# Patient Record
Sex: FEMALE | Employment: OTHER | URBAN - METROPOLITAN AREA
[De-identification: names, ages, dates, MRNs, and addresses within clinical notes are randomized per-mention and may not be internally consistent; named-entity substitution may affect disease eponyms.]

---

## 2024-09-17 PROCEDURE — 0753T DGTZ GLS MCRSCP SLD LEVEL IV: CPT

## 2024-09-17 PROCEDURE — 88305 TISSUE EXAM BY PATHOLOGIST: CPT | Performed by: SPECIALIST

## 2024-09-17 PROCEDURE — 88305 TISSUE EXAM BY PATHOLOGIST: CPT

## 2024-09-18 ENCOUNTER — LAB REQUISITION (OUTPATIENT)
Dept: LAB | Facility: HOSPITAL | Age: 69
End: 2024-09-18
Payer: COMMERCIAL

## 2024-09-18 DIAGNOSIS — R10.11 RIGHT UPPER QUADRANT PAIN: ICD-10-CM

## 2024-09-26 LAB
LABORATORY COMMENT REPORT: NORMAL
PATH REPORT.FINAL DX SPEC: NORMAL
PATH REPORT.GROSS SPEC: NORMAL
PATH REPORT.RELEVANT HX SPEC: NORMAL
PATH REPORT.TOTAL CANCER: NORMAL

## 2024-10-22 ENCOUNTER — DOCUMENTATION (OUTPATIENT)
Dept: GASTROENTEROLOGY | Facility: HOSPITAL | Age: 69
End: 2024-10-22
Payer: COMMERCIAL

## 2024-10-22 NOTE — PROGRESS NOTES
Dr. Armando Heart's office received a referral from Dr. Ronak Barfield for this patient to undergo an ERCP for entero-biliary fistula. Dr. Uri Sanchez reviewed the referral on 10/21/2024. Per Dr. Sanchez, patient to get scheduled for a clinic consultation with Dr. Heart.    Concetta Abbasi was notified to call and schedule this patient. Contact Belinda Jones RN at [phone] for any questions.      See below for supporting clinical information from the referral sent by Dr. Barfield's office and from medical records:    Office Visit  10/16/2024  Maricruz Martinez - 69 y.o. Female; born [birth year]Progress note - 10/16/2024, generated on Oct. 18, 2024October 18, 2024  Allergies     No Known Allergies  Reason For Referral     Reason For Referral  Reason referral to advanced endoscopist to consider ERCP for entero-biliary fistula, also referred to hepato-biliary surgeon  referral in pt docs and faxed to Fely De Guzman @ 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 Mares for entero-biliary fistula, also referred to advanced endoscopist to discuss ERCP  referral in pt docs and faxed to Fely De Guzman @ 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)  Referral Organization  [facility]  Referring Provider First Name Ronak  Referring Provider Last Name            Lico  [facility]            [address] [place] [address]           10/16/2024            Ronak Barfield Epigastric abdominal pain R10.13 ; Chronic nausea R11.0 ; Postprandial RUQ pain R10.11 ; History of  cholecystectomy Z90.49 ; Loose stools R19.5 ; Pneumobilia K83.8 ; Biliary tract fistula K83.3 and Common bile duct dilatation     10/16/2024     Biliary tract fistula (ICD-10 - 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.3)                       Imaging showing pneumobilia and entero-biliary fistula with dilated CBD, pt with RUQ pain with certain foods especially carbohydrates however overall much improved  LFTs/GGT, CBC ordered, encouraged patient to have these done  Referral to hepatobiliary surgeon  Referral to advanced endoscopist for to discuss possible ERCP and EUS     Plan Of Treatment     Treatment Notes  Plan Of Treatment  Assessment     Notes  Postprandial RUQ pain            Describes chronic early morning nausea with right upper quadrant abdominal pain which worsens with food especially carbohydrates. No dyspepsia features. Symptoms overall improved but persistent when she consumes carbohydrate rich foods. EGD, colonoscopy, CT abdomen pelvis, MRI of the abdomen reviewed today with results noted above. Signs of possible enterobiliary fistula  Breath test given to assess for CSID  Continue to avoid simple sugars/carbohydrates  Avoid NSAIDs  Patient prefers to avoid any medication  Rest as below  Loose stools    Describes chronic mud-like stools, change in bowel habits overall. She is on magnesium supplements which is likely contributing. Colonoscopy with biopsies unremarkable  Biliary tract fistula       Imaging showing pneumobilia and entero-biliary fistula with dilated CBD, pt with RUQ pain with certain foods especially carbohydrates however overall much improved  LFTs/GGT, CBC ordered, encouraged patient to have these done  Referral to hepatobiliary surgeon  Referral to advanced endoscopist for to discuss possible ERCP and EUS        Interval Hx 10/16/24:  9/17/24 EGD Showed showed normal duodenum, biopsies negative for celiac disease, mild gastritis, biopsies negative for H. pylori, mild esophagitis, biopsies negative for Connor's  esophagus. Otherwise normal. Colonoscopy showed small HP of the sigmoid colon, Normal-appearing colon otherwise biopsies negative for microscopic colitis, Diverticulosis of the left colon  CT abd/pel 9/5/24 showed left-sided pneumobilia, unchanged from prior exam dated the 2021, and may berelated to prior sphincterotomy versus enterobiliary fistula. Correlate clinically.2. Few scattered colonic diverticula. No acute diverticulitis.     10/14/24 MRI abdomen w/ and w/o showed dilatation of the common bile duct measuring at least 14 mm, this abuts the distal stomach/proximal duodenum which courses into the gallbladder fossa. On prior studies pneumobilia was present. Findings again raise the possibility of enterobiliary fistula. Intraluminal fluid signal involving the distal stomach/proximal duodenum limits evaluation on MRCP.2. Hepatic steatosis.3. Indeterminate 1.8 cm subcapsular enhancing wedge-shaped lesion liver dome,indeterminate possibly focal scarring/fibrosis. Recommend follow-up 3-6 months or as per clinical protocol.4. Status post cholecystectomy     Surgical History: Cholecystectomy 1972 Tonsillectomy Colonoscopy & EGD - Dr. Barfield 9/2024

## 2024-10-24 ENCOUNTER — TELEPHONE (OUTPATIENT)
Dept: GASTROENTEROLOGY | Facility: CLINIC | Age: 69
End: 2024-10-24
Payer: COMMERCIAL

## 2024-11-26 ENCOUNTER — OFFICE VISIT (OUTPATIENT)
Dept: GASTROENTEROLOGY | Facility: CLINIC | Age: 69
End: 2024-11-26
Payer: COMMERCIAL

## 2024-11-26 VITALS
SYSTOLIC BLOOD PRESSURE: 139 MMHG | HEART RATE: 66 BPM | DIASTOLIC BLOOD PRESSURE: 71 MMHG | TEMPERATURE: 97.3 F | BODY MASS INDEX: 28.85 KG/M2 | HEIGHT: 64 IN | WEIGHT: 169 LBS

## 2024-11-26 DIAGNOSIS — K59.9 MALDIGESTION SYNDROME: Primary | ICD-10-CM

## 2024-11-26 DIAGNOSIS — K83.8 PNEUMOBILIA: ICD-10-CM

## 2024-11-26 PROCEDURE — 1125F AMNT PAIN NOTED PAIN PRSNT: CPT | Performed by: INTERNAL MEDICINE

## 2024-11-26 PROCEDURE — 99203 OFFICE O/P NEW LOW 30 MIN: CPT | Performed by: INTERNAL MEDICINE

## 2024-11-26 PROCEDURE — 1159F MED LIST DOCD IN RCRD: CPT | Performed by: INTERNAL MEDICINE

## 2024-11-26 PROCEDURE — 1036F TOBACCO NON-USER: CPT | Performed by: INTERNAL MEDICINE

## 2024-11-26 PROCEDURE — 99213 OFFICE O/P EST LOW 20 MIN: CPT | Performed by: INTERNAL MEDICINE

## 2024-11-26 PROCEDURE — 3008F BODY MASS INDEX DOCD: CPT | Performed by: INTERNAL MEDICINE

## 2024-11-26 PROCEDURE — 1160F RVW MEDS BY RX/DR IN RCRD: CPT | Performed by: INTERNAL MEDICINE

## 2024-11-26 RX ORDER — CALCIUM CARBONATE 500(1250)
1 TABLET ORAL
COMMUNITY

## 2024-11-26 RX ORDER — KRILL/OM-3/DHA/EPA/PHOSPHO/AST 1000-170MG
CAPSULE ORAL
COMMUNITY

## 2024-11-26 RX ORDER — MULTIVITAMIN
1 TABLET ORAL DAILY
COMMUNITY

## 2024-11-26 RX ORDER — LICORICE ROOT 450 MG
CAPSULE ORAL
COMMUNITY

## 2024-11-26 RX ORDER — PHENYLEPHRINE HCL 10 MG
TABLET ORAL
COMMUNITY

## 2024-11-26 RX ORDER — CHROMIUM PICOLINATE 200 MCG
1 TABLET ORAL DAILY
COMMUNITY

## 2024-11-26 RX ORDER — QUINIDINE GLUCONATE 324 MG
1 TABLET, EXTENDED RELEASE ORAL DAILY
COMMUNITY

## 2024-11-26 RX ORDER — LORAZEPAM 1 MG
TABLET ORAL
COMMUNITY

## 2024-11-26 RX ORDER — ACETAMINOPHEN 160 MG/5ML
SUSPENSION, ORAL (FINAL DOSE FORM) ORAL
COMMUNITY

## 2024-11-26 RX ORDER — VITAMIN B COMPLEX
CAPSULE ORAL
COMMUNITY

## 2024-11-26 RX ORDER — LACTOBACILLUS COMBINATION NO.4 3B CELL
CAPSULE ORAL
COMMUNITY

## 2024-11-26 RX ORDER — CYANOCOBALAMIN (VITAMIN B-12) 1500 MCG
TABLET,CHEWABLE ORAL
COMMUNITY

## 2024-11-26 ASSESSMENT — PAIN SCALES - GENERAL: PAINLEVEL_OUTOF10: 10-WORST PAIN EVER

## 2024-11-26 ASSESSMENT — ENCOUNTER SYMPTOMS: CONSTITUTIONAL NEGATIVE: 1

## 2024-11-26 NOTE — PROGRESS NOTES
Subjective   Patient ID: Maricruz Martinez is a 69 y.o. female who presents for New Patient Visit (New patient) and Abdominal Pain.  Referred by Dr. Barfield for abdominal pain and a biliary enteric fistula   Grew up in [place]     Severe RLQ pain July 2024 after eating bread at a party    Bile duct stones at age 14 back in 1969 followed by surgery   April 1972 GB removed at [facility] by Dr. Hernandez     EGD done by Dr. Barfield   Colonoscopy September 2024 - given 5 year interval   Last 15 years she has gotten worse     Diet - strict keto diet for weight loss    MRI noted pneumobilia and atrophic pancreas   Stools chronically loose and thought to be due to magnesium   Discussed supplements and insistent on staying with magnesium and thyroid replacement due to restless leg syndrome            Review of Systems   Constitutional: Negative.        Objective   Physical Exam  Constitutional:       Appearance: Normal appearance. She is normal weight.   Cardiovascular:      Rate and Rhythm: Normal rate and regular rhythm.   Pulmonary:      Effort: Pulmonary effort is normal.   Abdominal:      General: Abdomen is flat.      Palpations: Abdomen is soft.      Comments: Large scar RUQ    Neurological:      Mental Status: She is alert.   Psychiatric:         Mood and Affect: Mood normal.         Behavior: Behavior normal.         Thought Content: Thought content normal.         Judgment: Judgment normal.         Assessment/Plan   Problem List Items Addressed This Visit             ICD-10-CM    Maldigestion syndrome - Primary K59.9    Pneumobilia K83.8     Please stop as many supplements as possible for a holiday over the holidays.    Please submit a stool sample for pancreatic elastase for a condition known as EPI - if that is the issue, you can rely on whole foods with enzymes to naturally avoid supplements.     Armando Heart, DO Armando Heart,  11/26/24 10:24 AM

## 2024-12-05 ENCOUNTER — LAB (OUTPATIENT)
Dept: LAB | Facility: LAB | Age: 69
End: 2024-12-05
Payer: COMMERCIAL

## 2024-12-05 DIAGNOSIS — K59.9 MALDIGESTION SYNDROME: ICD-10-CM

## 2024-12-10 LAB — ELASTASE PANC STL-MCNT: >800 UG/G

## 2024-12-12 ENCOUNTER — TELEPHONE (OUTPATIENT)
Dept: GASTROENTEROLOGY | Facility: CLINIC | Age: 69
End: 2024-12-12
Payer: COMMERCIAL

## 2024-12-23 ENCOUNTER — APPOINTMENT (OUTPATIENT)
Dept: SURGICAL ONCOLOGY | Facility: CLINIC | Age: 69
End: 2024-12-23
Payer: COMMERCIAL

## 2024-12-23 VITALS
SYSTOLIC BLOOD PRESSURE: 138 MMHG | RESPIRATION RATE: 20 BRPM | BODY MASS INDEX: 28.41 KG/M2 | HEART RATE: 53 BPM | HEIGHT: 65 IN | WEIGHT: 170.5 LBS | TEMPERATURE: 97.4 F | DIASTOLIC BLOOD PRESSURE: 81 MMHG

## 2024-12-23 DIAGNOSIS — R10.10 PAIN OF UPPER ABDOMEN: Primary | ICD-10-CM

## 2024-12-23 PROCEDURE — 1125F AMNT PAIN NOTED PAIN PRSNT: CPT | Performed by: SURGERY

## 2024-12-23 PROCEDURE — 99203 OFFICE O/P NEW LOW 30 MIN: CPT | Performed by: SURGERY

## 2024-12-23 PROCEDURE — 1036F TOBACCO NON-USER: CPT | Performed by: SURGERY

## 2024-12-23 PROCEDURE — 1159F MED LIST DOCD IN RCRD: CPT | Performed by: SURGERY

## 2024-12-23 PROCEDURE — 3008F BODY MASS INDEX DOCD: CPT | Performed by: SURGERY

## 2024-12-23 ASSESSMENT — PAIN SCALES - GENERAL: PAINLEVEL_OUTOF10: 1

## 2024-12-23 NOTE — PROGRESS NOTES
Subjective     Maricruz Martinez is a 69 y.o. female who is referred by Dr. Ronak Barfield. Gastroenterology. Question of biliary enteric fistula, abdominal pain in RUQ.    HPI  The patient has a complicated biliary surgical history with a cholecystectomy many years ago.  She also had common duct stone explorations and sphincterotomy was.  She now has right upper quadrant pain intermittently.  I think of note, the pain improves with dietary modifications.  She has been on a ketogenic diet fairly reliably for the past 6 years and notes that when she is most disappointed with it, her pain is minimal.  She has significant bloating distention and discomfort when she consumes carbohydrates.  In speaking with her, her main anxiety is that this represents a cancer.  She is hoping for reassurance to the contrary.  She is lost nearly 100 pounds over the last many years.  This has been intentional.      Review of Systems   All other systems reviewed and are negative.           Social History     Socioeconomic History    Marital status:      Spouse name: Not on file    Number of children: Not on file    Years of education: Not on file    Highest education level: Not on file   Occupational History    Not on file   Tobacco Use    Smoking status: Never    Smokeless tobacco: Never   Substance and Sexual Activity    Alcohol use: Never    Drug use: Yes     Types: Marijuana    Sexual activity: Not on file   Other Topics Concern    Not on file   Social History Narrative    Not on file     Social Drivers of Health     Financial Resource Strain: Low Risk  (8/4/2022)    Received from [facility]    Overall Financial Resource Strain (CARDIA)     Difficulty of Paying Living Expenses: Not very hard   Food Insecurity: No Food Insecurity (8/4/2022)    Received from [facility]    Hunger Vital Sign     Worried About Running Out of Food in the Last Year: Never true     Ran Out of Food in the Last Year:  Never true   Transportation Needs: No Transportation Needs (8/4/2022)    Received from [facility]    PRAPARE - Transportation     Lack of Transportation (Medical): No     Lack of Transportation (Non-Medical): No   Physical Activity: Insufficiently Active (8/4/2022)    Received from [facility]    Exercise Vital Sign     Days of Exercise per Week: 2 days     Minutes of Exercise per Session: 30 min   Stress: No Stress Concern Present (8/4/2022)    Received from [facility]    [place] [employer] - Occupational Stress Questionnaire     Feeling of Stress : Not at all   Social Connections: Moderately Integrated (8/4/2022)    Received from [facility]    Social Connection and Isolation Panel [NHANES]     Frequency of Communication with Friends and Family: More than three times a week     Frequency of Social Gatherings with Friends and Family: More than three times a week     Attends Spiritism Services: More than 4 times per year     Active Member of Clubs or Organizations: Yes     Attends Club or Organization Meetings: More than 4 times per year     Marital Status:    Intimate Partner Violence: Not on file   Housing Stability: Low Risk  (8/4/2022)    Received from [facility]    Housing Stability Vital Sign     Unable to Pay for Housing in the Last Year: No     Number of Places Lived in the Last Year: 1     Unstable Housing in the Last Year: No      Current Outpatient Medications on File Prior to Visit   Medication Sig Dispense Refill    ashwagandha extract 500 mg capsule Take by mouth.      b complex vitamins capsule Take by mouth.      calcium carbonate (Oscal) 500 mg calcium (1,250 mg) tablet Take 1 tablet (1,250 mg) by mouth 2 times a day before meals.      chromium picolinate 200 mcg tablet tablet Take 1 tablet (200 mcg) by mouth once daily.      Cinnamon 500 mg capsule Take by  "mouth.      ferrous gluconate (Fergon) 240 (27 Fe) MG tablet Take 1 tablet (27 mg of iron) by mouth once daily.      guarana/calcium/phosphorus (GUARANA ORAL) as directed      krill-om-3-dha-epa-phospho-ast (krill oil) 1,814-054-14-80 mg capsule Take by mouth.      lactobacillus combination no.4 (Probiotic) 3 billion cell capsule Take by mouth.      licorice root, G.glabra, 450 mg capsule Take by mouth.      magnesium aspart,citrate,oxide 400 mg magnesium capsule Take by mouth.      multivit with min-folic acid 0.4 mg tablet Take 1 tablet by mouth once daily.      NON FORMULARY Berberine  MG      NON FORMULARY Turmeric Complex/Black Pepper 5-1000 MG      NON FORMULARY RAW THYROID      royal jelly-bee pollen-ginseng -250 mg capsule Take by mouth.      safflower oil-linoleic acid,co (CLA) 1,000 mg capsule Take by mouth.      coenzyme Q-10 200 mg capsule Take by mouth.       No current facility-administered medications on file prior to visit.      No family history on file.   No past medical history on file.   Past Surgical History:   Procedure Laterality Date    CHOLECYSTECTOMY      TONSILLECTOMY Bilateral 1974        Objective     /81 (BP Location: Left arm)   Pulse 53   Temp 36.3 °C (97.4 °F) (Oral)   Resp 20   Ht 1.64 m (5' 4.57\")   Wt 77.3 kg (170 lb 8 oz)   BMI 28.75 kg/m²      Physical Exam  General: in no acute distress, comfortable  Eyes: no pallor or scleral icterus  Ears, nose, throat: no oropharyngeal edema  Cardiovascular: normal rate, regular rhythm  Respiratory: no wheezing. Even and unlabored.  Gastrointestinal: abdomen soft, non-tender, no masses  Musculoskeletal: normal gate, no deformities  Integumentary: no concerning lesions, no jaundice  Neurologic: no gross deficits  Psychiatric: cognition intact, mood appropriate    RESULTS    Reviewed her imaging.  There is pneumobilia on the left side of the biliary system.  Patient is status post cholecystectomy.  There are no " periampullary pancreatic or hepatic masses.  No ductal dilatation.      Assessment/Plan      In summary, the tentative diagnosis of biliary enteric fistula is a possibility based on the previous gallbladder and biliary procedures, however I do not think that that there is clinical significance.  We doing hepaticojejunostomy as treatment for biliary obstruction we performed a intentional biliary enteric fistula in several months that her symptoms of cholangitis, I do not think any intervention is necessary.  I communicated this to her gastroenterologist.  I do not think an EUS is necessary either to workup her abdominal pain as I think her gastroenterologist very convincingly and conclusively ruled out any ulcer or gastritis disease.  I think she just needs careful follow-up with her primary care physician at this time and she was very comfortable with that assessment.    Marlo Mares MD

## 2025-02-03 ENCOUNTER — APPOINTMENT (OUTPATIENT)
Dept: GASTROENTEROLOGY | Facility: CLINIC | Age: 70
End: 2025-02-03
Payer: COMMERCIAL